# Patient Record
Sex: FEMALE | Race: WHITE
[De-identification: names, ages, dates, MRNs, and addresses within clinical notes are randomized per-mention and may not be internally consistent; named-entity substitution may affect disease eponyms.]

---

## 2021-04-30 ENCOUNTER — HOSPITAL ENCOUNTER (OUTPATIENT)
Dept: HOSPITAL 11 - JP.SDS | Age: 61
Discharge: HOME | End: 2021-04-30
Attending: SURGERY
Payer: COMMERCIAL

## 2021-04-30 DIAGNOSIS — E11.9: ICD-10-CM

## 2021-04-30 DIAGNOSIS — Z12.11: Primary | ICD-10-CM

## 2021-04-30 DIAGNOSIS — K57.30: ICD-10-CM

## 2021-04-30 PROCEDURE — 45378 DIAGNOSTIC COLONOSCOPY: CPT

## 2021-04-30 NOTE — OR
DATE OF PROCEDURE:  04/30/2021

 

SURGEON:  Christopher Cross MD

 

PROCEDURE:  Colonoscopy.

 

FINDINGS:  Diverticulosis, mild, limited to sigmoid colon.

 

COMPLICATIONS:  None.

 

ASSISTANT:  None.

 

ANESTHESIA:  MAC.

 

PREOPERATIVE DIAGNOSIS:  Screening colonoscopy.

 

POSTOPERATIVE DIAGNOSIS:  Screening colonoscopy.

 

RISKS:  Risks, benefits, alternatives, and limitations including, but not limited to

infection, bleeding, perforation, and false positives and false negatives were explained to

the patient who wished to proceed.

 

PROCEDURE IN DETAIL:  The patient was placed in left lateral decubitus position.  Digital

rectal exam was performed without abnormality.  Scope was introduced and advanced

atraumatically to the ileocecal valve.  Scope was brought back to the ascending, transverse,

descending colon, and retroflexed.

 

No evidence of old or new blood.  No masses.  No polyps.  No colitis.  No abnormalities on

retroflexion.  The diverticulosis would be described as mild, limited to sigmoid colon

without evidence of diverticulitis.

 

Greater than 8 minutes was spent removing the scope.  The patient tolerated the procedure

well, and the prep was acceptable with 90% of the luminal surface.

 

 

 

 

Christopher Cross MD

DD:  04/30/2021 11:03:57

DT:  04/30/2021 14:07:35

Job #:  [id number]

## 2021-05-25 ENCOUNTER — RECORDS - HEALTHEAST (OUTPATIENT)
Dept: ADMINISTRATIVE | Facility: CLINIC | Age: 61
End: 2021-05-25

## 2021-05-26 ENCOUNTER — RECORDS - HEALTHEAST (OUTPATIENT)
Dept: ADMINISTRATIVE | Facility: CLINIC | Age: 61
End: 2021-05-26

## 2021-12-02 ENCOUNTER — HOSPITAL ENCOUNTER (EMERGENCY)
Dept: HOSPITAL 11 - JP.ED | Age: 61
Discharge: HOME | End: 2021-12-02
Payer: COMMERCIAL

## 2021-12-02 DIAGNOSIS — Z79.82: ICD-10-CM

## 2021-12-02 DIAGNOSIS — E86.0: Primary | ICD-10-CM

## 2021-12-02 DIAGNOSIS — Z79.899: ICD-10-CM

## 2021-12-02 DIAGNOSIS — E78.00: ICD-10-CM

## 2021-12-02 DIAGNOSIS — Z79.84: ICD-10-CM

## 2021-12-02 DIAGNOSIS — E11.9: ICD-10-CM

## 2021-12-02 DIAGNOSIS — I10: ICD-10-CM

## 2021-12-02 PROCEDURE — 87040 BLOOD CULTURE FOR BACTERIA: CPT

## 2021-12-02 PROCEDURE — 84443 ASSAY THYROID STIM HORMONE: CPT

## 2021-12-02 PROCEDURE — 71046 X-RAY EXAM CHEST 2 VIEWS: CPT

## 2021-12-02 PROCEDURE — 83605 ASSAY OF LACTIC ACID: CPT

## 2021-12-02 PROCEDURE — 99284 EMERGENCY DEPT VISIT MOD MDM: CPT

## 2021-12-02 PROCEDURE — 85379 FIBRIN DEGRADATION QUANT: CPT

## 2021-12-02 PROCEDURE — 84484 ASSAY OF TROPONIN QUANT: CPT

## 2021-12-02 PROCEDURE — 85027 COMPLETE CBC AUTOMATED: CPT

## 2021-12-02 PROCEDURE — 36415 COLL VENOUS BLD VENIPUNCTURE: CPT

## 2021-12-02 PROCEDURE — 80048 BASIC METABOLIC PNL TOTAL CA: CPT

## 2021-12-02 PROCEDURE — 81001 URINALYSIS AUTO W/SCOPE: CPT

## 2021-12-02 NOTE — EDM.PDOC
ED HPI GENERAL MEDICAL PROBLEM





- General


Chief Complaint: Syncope


Stated Complaint: FAINTED TWICE


Time Seen by Provider: 12/02/21 04:20


Source of Information: Reports: Patient, Old Records, RN


History Limitations: Reports: No Limitations





- History of Present Illness


INITIAL COMMENTS - FREE TEXT/NARRATIVE: 





62 yo diabetic female had a low grade fever at suppertime last night. After that

she felt OK. She slept until she awoke needing to go to the bathroom. On the way

back to her bed from the bathroom she passed out twice. On the way to the ER she

walked without issue. Has had a bit of a cough lately, nonproductive. No black 

or bloody stools. No vomiting. No hx of syncope. Her BS at home after she passed

out was 190. She did not injure herself in the falls. Is here with her . 

She took Nyquil before bedtime that has acetaminophen in it. 


Onset Date: 12/01/21


Duration: Hour(s):, Waxing/Waning


Location: Reports: Generalized


Quality: Reports: Other (pain is not reported)


Severity: Moderate (syncope severity)


Improves with: Reports: Rest


Worsens with: Reports: Other (? vertical position)


Context: Reports: Other (see HPI)


Associated Symptoms: Reports: Cough (mild), Fever/Chills (low grade fever early 

last evening).  Denies: Nausea/Vomiting, Rash


Treatments PTA: Reports: Other (see below) (none)





- Related Data


                                    Allergies











Allergy/AdvReac Type Severity Reaction Status Date / Time


 


No Known Allergies Allergy   Verified 12/02/21 04:50











Home Meds: 


                                    Home Meds





Anastrozole [Arimidex] 1 mg PO DAILY 04/27/21 [History]


Aspirin [Adult Low Dose Aspirin EC] 81 mg PO DAILY 04/27/21 [History]


Cholecalciferol (Vitamin D3) [Vitamin D3] 1,000 unit PO DAILY 04/27/21 [History]


Dulaglutide [Trulicity] 0.75 mg SQ DAILY 04/27/21 [History]


Omega-3/DHA/Epa/Fish Oil [Omega 3 500 Softgel] 1,000 each PO DAILY 04/27/21 

[History]


Simvastatin [Zocor] 20 mg PO BEDTIME 04/27/21 [History]


Vit C/Rut/Hesp Cmp/Bioflav,Cit [Special C 500 mg Tablet] 1 each PO DAILY 

04/27/21 [History]


lisinopriL [Lisinopril] 5 mg PO DAILY 04/27/21 [History]


metFORMIN [Glucophage XR] 1,000 mg PO BID 04/27/21 [History]











Past Medical History


HEENT History: Reports: None


Cardiovascular History: Reports: High Cholesterol, Hypertension


OB/GYN History: Reports: Pregnancy


Neurological History: Reports: Concussion


Endocrine/Metabolic History: Reports: Diabetes, Type II


Oncologic (Cancer) History: Reports: Breast


Dermatologic History: Reports: None





- Infectious Disease History


Infectious Disease History: Reports: Chicken Pox, Shingles





- Past Surgical History


HEENT Surgical History: Reports: Oral Surgery


Cardiovascular Surgical History: Reports: None


Respiratory Surgical History: Reports: None


Female  Surgical History: Reports: Tubal Ligation, Other (See Below)


Other Female  Surgeries/Procedures: double mastectomy for breast cancer


Endocrine Surgical History: Reports: None


Neurological Surgical History: Reports: None


Dermatological Surgical History: Reports: Skin Graft





Social & Family History





- Family History


Family Medical History: No Pertinent Family History





- Caffeine Use


Caffeine Use: Reports: Coffee





ED ROS GENERAL





- Review of Systems


Review Of Systems: See Below


Constitutional: Reports: Fever.  Denies: Chills


HEENT: Reports: No Symptoms


Respiratory: Reports: Cough.  Denies: Shortness of Breath, Sputum


Cardiovascular: Reports: Syncope


Endocrine: Reports: No Symptoms


GI/Abdominal: Reports: No Symptoms


: Reports: No Symptoms


Musculoskeletal: Reports: No Symptoms


Skin: Reports: No Symptoms


Neurological: Reports: No Symptoms





- Physical Exam


Exam: See Below


Exam Limited By: No Limitations


General Appearance: Alert, WD/WN, No Apparent Distress


Eye Exam: Bilateral Eye: Normal Inspection


Ears: Normal External Exam, Normal Canal, Hearing Grossly Normal


Nose: Normal Inspection, No Blood


Throat/Mouth: Normal Inspection, Normal Lips, Normal Oropharynx, Normal Voice, 

No Airway Compromise


Head Exam: Atraumatic, Normocephalic


Neck: Normal Inspection


Respiratory/Chest: No Respiratory Distress, Lungs Clear, Normal Breath Sounds, 

No Accessory Muscle Use


Cardiovascular: Regular Rate, Rhythm, No Edema, Tachycardia


GI/Abdominal: Normal Bowel Sounds, Soft, Non-Tender, No Distention


Neuro Exam (Abbreviated): Alert, Oriented, CN II-XII Intact, Normal Cognition, 

No Motor/Sensory Deficits


Back Exam: Normal Inspection.  No: CVA Tenderness (R), CVA Tenderness (L)


Extremities: Normal Inspection, Normal Range of Motion, Non-Tender, No Pedal 

Edema.  No: Pedal Edema


Psychiatric: Normal Affect, Normal Mood


Skin Exam: Warm, Dry, Intact, Normal Color, No Rash





Course





- Vital Signs


Last Recorded V/S: 


                                Last Vital Signs











Temp  36.8 C   12/02/21 05:25


 


Pulse  112 H  12/02/21 04:52


 


Resp  18   12/02/21 04:52


 


BP  155/88 H  12/02/21 04:52


 


Pulse Ox  97   12/02/21 04:52








                                        





Orthostatic Blood Pressure [     121/74


Standing]                        


Orthostatic Blood Pressure [     124/76


Sitting]                         


Orthostatic Blood Pressure [     132/80


Supine]                          











- Orders/Labs/Meds


Orders: 


                               Active Orders 24 hr











 Category Date Time Status


 


 Orthostatic Vital Signs [RC] ASDIRECTED Care  12/02/21 04:18 Active


 


 Chest 2V [CR] Stat Exams  12/02/21 06:31 Taken


 


 CULTURE BLOOD [BC] Stat Lab  12/02/21 05:04 Ordered


 


 D-DIMER QUANTITATIVE [COAG] Stat Lab  12/02/21 04:04 Stop Req


 


 REFLEX LACTIC ACID YES OR NO [CHEM] Routine Lab  12/02/21 04:51 Received











Labs: 


                                Laboratory Tests











  12/02/21 12/02/21 12/02/21 Range/Units





  04:04 04:04 04:04 


 


WBC  7.7    (4.5-11.0)  K/uL


 


RBC  5.13    (3.30-5.50)  M/uL


 


Hgb  13.4    (12.0-15.0)  g/dL


 


Hct  42.9    (36.0-48.0)  %


 


MCV  84    (80-98)  fL


 


MCH  26 L    (27-31)  pg


 


MCHC  31 L    (32-36)  %


 


Plt Count  403 H    (150-400)  K/uL


 


Sodium   137 L   (140-148)  mmol/L


 


Potassium   3.6   (3.6-5.2)  mmol/L


 


Chloride   99 L   (100-108)  mmol/L


 


Carbon Dioxide   26   (21-32)  mmol/L


 


Anion Gap   15.6 H   (5.0-14.0)  mmol/L


 


BUN   8   (7-18)  mg/dL


 


Creatinine   0.8   (0.6-1.0)  mg/dL


 


Est Cr Clr Drug Dosing   54.39   mL/min


 


Estimated GFR (MDRD)   > 60   (>60)  


 


Glucose   194 H   ()  mg/dL


 


Lactic Acid     (0.4-2.0)  mmol/L


 


Calcium   9.3   (8.5-10.1)  mg/dL


 


Troponin I High Sens   4.6   (<=60.3)  pg/mL


 


TSH, Ultra Sensitive    2.469  (0.358-3.740)  uIU/mL


 


Urine Color     (YELLOW)  


 


Urine Appearance     (CLEAR)  


 


Urine pH     (5.0-8.0)  


 


Ur Specific Gravity     (1.008-1.030)  


 


Urine Protein     (NEGATIVE)  mg/dL


 


Urine Glucose (UA)     (NEGATIVE)  mg/dL


 


Urine Ketones     (NEGATIVE)  mg/dL


 


Urine Occult Blood     (NEGATIVE)  


 


Urine Nitrite     (NEGATIVE)  


 


Urine Bilirubin     (NEGATIVE)  


 


Urine Urobilinogen     (0.2-1.0)  EU/dL


 


Ur Leukocyte Esterase     (NEGATIVE)  


 


Urine RBC     (0-5)  


 


Urine WBC     (0-5)  


 


Ur Epithelial Cells     


 


Amorphous Sediment     


 


Urine Bacteria     


 


Urine Mucus     














  12/02/21 12/02/21 Range/Units





  04:07 05:09 


 


WBC    (4.5-11.0)  K/uL


 


RBC    (3.30-5.50)  M/uL


 


Hgb    (12.0-15.0)  g/dL


 


Hct    (36.0-48.0)  %


 


MCV    (80-98)  fL


 


MCH    (27-31)  pg


 


MCHC    (32-36)  %


 


Plt Count    (150-400)  K/uL


 


Sodium    (140-148)  mmol/L


 


Potassium    (3.6-5.2)  mmol/L


 


Chloride    (100-108)  mmol/L


 


Carbon Dioxide    (21-32)  mmol/L


 


Anion Gap    (5.0-14.0)  mmol/L


 


BUN    (7-18)  mg/dL


 


Creatinine    (0.6-1.0)  mg/dL


 


Est Cr Clr Drug Dosing    mL/min


 


Estimated GFR (MDRD)    (>60)  


 


Glucose    ()  mg/dL


 


Lactic Acid  3.1 H   (0.4-2.0)  mmol/L


 


Calcium    (8.5-10.1)  mg/dL


 


Troponin I High Sens    (<=60.3)  pg/mL


 


TSH, Ultra Sensitive    (0.358-3.740)  uIU/mL


 


Urine Color   Yellow  (YELLOW)  


 


Urine Appearance   Cloudy A  (CLEAR)  


 


Urine pH   5.5  (5.0-8.0)  


 


Ur Specific Gravity   1.025  (1.008-1.030)  


 


Urine Protein   Negative  (NEGATIVE)  mg/dL


 


Urine Glucose (UA)   Negative  (NEGATIVE)  mg/dL


 


Urine Ketones   15 H  (NEGATIVE)  mg/dL


 


Urine Occult Blood   Negative  (NEGATIVE)  


 


Urine Nitrite   Negative  (NEGATIVE)  


 


Urine Bilirubin   Negative  (NEGATIVE)  


 


Urine Urobilinogen   0.2  (0.2-1.0)  EU/dL


 


Ur Leukocyte Esterase   Small H  (NEGATIVE)  


 


Urine RBC   0-5  (0-5)  


 


Urine WBC   5-10 H  (0-5)  


 


Ur Epithelial Cells   Moderate  


 


Amorphous Sediment   Not seen  


 


Urine Bacteria   Few  


 


Urine Mucus   Many  











Meds: 


Medications














Discontinued Medications














Generic Name Dose Route Start Last Admin





  Trade Name Freq  PRN Reason Stop Dose Admin


 


Acetaminophen  1,000 mg  12/02/21 05:05  12/02/21 05:45





  Acetaminophen 500 Mg Tab  PO  12/02/21 05:06  1,000 mg





  ONETIME ONE   Administration


 


Lactated Ringer's  1,000 mls @ 1,000 mls/hr  12/02/21 05:04  12/02/21 05:44





  Ringers, Lactated  IV  12/02/21 06:03  1,000 mls/hr





  BOLUS ONE   Administration














- Radiology Interpretation


Free Text/Narrative:: 





CXR-neg





- Re-Assessments/Exams


Free Text/Narrative Re-Assessment/Exam: 





12/02/21 07:03


HR down to 89 after a liter of IV fluids. Feels fine. CXR neg





Departure





- Departure


Time of Disposition: 07:04


Disposition: Home, Self-Care 01


Condition: Good


Clinical Impression: 


 Mild dehydration








- Discharge Information


*PRESCRIPTION DRUG MONITORING PROGRAM REVIEWED*: Not Applicable


*COPY OF PRESCRIPTION DRUG MONITORING REPORT IN PATIENT JOHNNA: Not Applicable


Instructions:  Dehydration, Adult, Easy-to-Read


Referrals: 


PCP,None [Primary Care Provider] - 


Forms:  ED Department Discharge


Additional Instructions: 


Drink enough fluids so that your urine is light yellow in color. Acetaminophen 

as needed for fever control. Return if you become SOB, high fever, nausea or 

anything else that concerns you. 





Sepsis Event Note (ED)





- Focused Exam


Vital Signs: 


                                   Vital Signs











  Temp Pulse Resp BP Pulse Ox


 


 12/02/21 05:25  36.8 C    


 


 12/02/21 04:52  36.7 C  112 H  18  155/88 H  97


 


 12/02/21 04:26  36.7 C  112 H  18  155/88 H  97














- My Orders


Last 24 Hours: 


My Active Orders





12/02/21 04:04


D-DIMER QUANTITATIVE [COAG] Stat 





12/02/21 04:18


Orthostatic Vital Signs [RC] ASDIRECTED 





12/02/21 04:51


REFLEX LACTIC ACID YES OR NO [CHEM] Routine 





12/02/21 05:04


CULTURE BLOOD [BC] Stat 





12/02/21 06:31


Chest 2V [CR] Stat 














- Assessment/Plan


Last 24 Hours: 


My Active Orders





12/02/21 04:04


D-DIMER QUANTITATIVE [COAG] Stat 





12/02/21 04:18


Orthostatic Vital Signs [RC] ASDIRECTED 





12/02/21 04:51


REFLEX LACTIC ACID YES OR NO [CHEM] Routine 





12/02/21 05:04


CULTURE BLOOD [BC] Stat 





12/02/21 06:31


Chest 2V [CR] Stat

## 2021-12-02 NOTE — CRLCR
For Patients:  As a result of the 21st Century Cures Act, medical imaging 

exams and procedure reports are released immediately into your electronic 

medical record.  You may view this report before your referring provider.  

If you have questions, please contact your health care provider.



Indication:



Cough and fever 



Comparison:



None available.



Technique:



PA and Lateral views chest



Findings:



There is mild interstitial prominence without evidence of dense 

consolidation or effusion. A situs lobe is seen in the medial right upper 

quadrant.



The cardiomediastinal silhouette is within normal limits.



The bony thorax is grossly intact.



Impression:



Mild interstitial prominence without evidence of dense consolidation which 

may represent mild bronchitis changes.



Dictated by Eh Ryan MD @ 12/2/2021 7:44:44 AM



(Electronically Signed)

## 2021-12-08 ENCOUNTER — HOSPITAL ENCOUNTER (EMERGENCY)
Dept: HOSPITAL 11 - JP.ED | Age: 61
Discharge: HOME | End: 2021-12-08
Payer: COMMERCIAL

## 2021-12-08 DIAGNOSIS — I10: ICD-10-CM

## 2021-12-08 DIAGNOSIS — Z79.82: ICD-10-CM

## 2021-12-08 DIAGNOSIS — Z79.899: ICD-10-CM

## 2021-12-08 DIAGNOSIS — U07.1: Primary | ICD-10-CM

## 2021-12-08 DIAGNOSIS — E78.00: ICD-10-CM

## 2021-12-08 DIAGNOSIS — Z79.84: ICD-10-CM

## 2021-12-08 DIAGNOSIS — E11.9: ICD-10-CM

## 2021-12-08 PROCEDURE — 87635 SARS-COV-2 COVID-19 AMP PRB: CPT

## 2021-12-08 PROCEDURE — U0002 COVID-19 LAB TEST NON-CDC: HCPCS

## 2021-12-08 PROCEDURE — M0243 CASIRIVI AND IMDEVI INFUSION: HCPCS

## 2021-12-08 PROCEDURE — 99284 EMERGENCY DEPT VISIT MOD MDM: CPT

## 2021-12-08 RX ADMIN — IMDEVIMAB ONE MG: 1332 INJECTION, SOLUTION, CONCENTRATE INTRAVENOUS at 12:34

## 2021-12-08 RX ADMIN — CASIRIVIMAB ONE MG: 1332 INJECTION, SOLUTION, CONCENTRATE INTRAVENOUS at 12:34

## 2021-12-08 NOTE — EDM.PDOC
ED HPI GENERAL MEDICAL PROBLEM





- General


Chief Complaint: Respiratory Problem


Stated Complaint: COUGH, NAUSEA, WEAKNESS


Time Seen by Provider: 12/08/21 10:30


Source of Information: Reports: Patient


History Limitations: Reports: No Limitations





- History of Present Illness


INITIAL COMMENTS - FREE TEXT/NARRATIVE: 





61-year-old female, unvaccinated for Covid, has symptoms of Covid-like illness 

for the past week.  Dizziness and lightheadedness, she was actually evaluated in

the emergency room for syncopal episode and was diagnosed with "mild 

dehydration".  Since that time she has developed some shortness of breath, body 

aches, loss of smell and her  has become ill as well with typical Covid 

symptoms.  They are both in to be tested and treated if possible.  She is mildly

tachycardic but otherwise very stable, no respiratory difficulties.


Onset: Gradual


Duration: Day(s): (7 days of symptoms)


Associated Symptoms: Reports: Cough, Malaise, Syncope, Weakness





- Related Data


                                    Allergies











Allergy/AdvReac Type Severity Reaction Status Date / Time


 


No Known Allergies Allergy   Verified 12/08/21 10:22











Home Meds: 


                                    Home Meds





Anastrozole [Arimidex] 1 mg PO DAILY 04/27/21 [History]


Aspirin [Adult Low Dose Aspirin EC] 81 mg PO DAILY 04/27/21 [History]


Cholecalciferol (Vitamin D3) [Vitamin D3] 1,000 unit PO DAILY 04/27/21 [History]


Dulaglutide [Trulicity] 0.75 mg SQ DAILY 04/27/21 [History]


Omega-3/DHA/Epa/Fish Oil [Omega 3 500 Softgel] 1,000 each PO DAILY 04/27/21 

[History]


Simvastatin [Zocor] 20 mg PO BEDTIME 04/27/21 [History]


Vit C/Rut/Hesp Cmp/Bioflav,Cit [Special C 500 mg Tablet] 1 each PO DAILY 

04/27/21 [History]


lisinopriL [Lisinopril] 5 mg PO DAILY 04/27/21 [History]


metFORMIN [Glucophage XR] 1,000 mg PO BID 04/27/21 [History]











Past Medical History


HEENT History: Reports: None


Cardiovascular History: Reports: High Cholesterol, Hypertension


OB/GYN History: Reports: Pregnancy


Neurological History: Reports: Concussion


Endocrine/Metabolic History: Reports: Diabetes, Type II


Oncologic (Cancer) History: Reports: Breast


Dermatologic History: Reports: None





- Infectious Disease History


Infectious Disease History: Reports: Chicken Pox, Shingles





- Past Surgical History


HEENT Surgical History: Reports: Oral Surgery


Cardiovascular Surgical History: Reports: None


Respiratory Surgical History: Reports: None


Female  Surgical History: Reports: Tubal Ligation, Other (See Below)


Other Female  Surgeries/Procedures: double mastectomy for breast cancer


Endocrine Surgical History: Reports: None


Neurological Surgical History: Reports: None


Dermatological Surgical History: Reports: Skin Graft





Social & Family History





- Family History


Family Medical History: No Pertinent Family History





- Tobacco Use


Tobacco Use Status *Q: Never Tobacco User





- Caffeine Use


Caffeine Use: Reports: Coffee





- Recreational Drug Use


Recreational Drug Use: No





ED ROS GENERAL





- Review of Systems


Review Of Systems: See Below


Constitutional: Reports: Fever (Patient ran a fever 1 week ago, that has 

resolved), Malaise


HEENT: Reports: Other (Lack of smell)


Respiratory: Reports: Shortness of Breath (Intermittent shortness of breath, 

none currently), Cough


Cardiovascular: Denies: Chest Pain, Palpitations


GI/Abdominal: Reports: No Symptoms


Musculoskeletal: Reports: Muscle Pain (Generalized body aches)


Skin: Reports: No Symptoms


Neurological: Reports: Dizziness, Weakness





ED EXAM, GENERAL





- Physical Exam


Exam: See Below


Exam Limited By: No Limitations


General Appearance: Alert, No Apparent Distress


Eye Exam: Bilateral Eye: Normal Inspection


Respiratory/Chest: No Respiratory Distress, Rhonchi (Diffuse scattered rhonchi 

and expiratory wheezes are heard bilaterally)


Cardiovascular: Regular Rate, Rhythm, Tachycardia


Extremities: Normal Inspection


Neurological: Alert, Oriented, No Motor/Sensory Deficits


Psychiatric: Normal Affect, Normal Mood


Skin Exam: Warm, Dry





Course





- Vital Signs


Last Recorded V/S: 


                                Last Vital Signs











Temp  98.0 F   12/08/21 10:20


 


Pulse  120 H  12/08/21 10:20


 


Resp  18   12/08/21 10:20


 


BP  156/82 H  12/08/21 10:20


 


Pulse Ox  96   12/08/21 10:20














- Orders/Labs/Meds


Labs: 


                                Laboratory Tests











  12/08/21 Range/Units





  10:52 


 


SARS CoV-2 RNA Rapid YOLANDA  Positive H  











Meds: 


Medications














Discontinued Medications














Generic Name Dose Route Start Last Admin





  Trade Name Freq  PRN Reason Stop Dose Admin


 


Acetaminophen  650 mg  12/08/21 12:00 





  Acetaminophen 325 Mg Tab  PO  





  ONETIME PRN  





  HEADACHE,CHILLS  


 


Casirivimab  600 mg  12/08/21 12:00  12/08/21 12:34





  Casirivimab ([id number]) 1,332 Mg/11.1 Ml Vial  SUBCUT  12/08/21 12:01  600 mg





  ONETIME ONE   Administration


 


Diphenhydramine HCl  50 mg  12/08/21 12:00 





  Diphenhydramine 50 Mg/Ml Sdv  IVPUSH  





  ONETIME PRN  





  ALLERGIC RXN  


 


Epinephrine HCl  0.3 mg  12/08/21 12:00 





  Epinephrine 1 Mg/Ml Sdv  IM  





  ONETIME PRN  





  ALLERGIC RXN  


 


Famotidine  20 mg  12/08/21 12:00 





  Famotidine 20 Mg/2 Ml Sdv  IV  





  ONETIME PRN  





  ALLERGIC RXN  


 


Imdevimab  600 mg  12/08/21 12:00  12/08/21 12:34





  Imdevimab ([id number]) 1,332 Mg/11.1 Ml Vial  SUBCUT  12/08/21 12:01  600 mg





  ONETIME ONE   Administration


 


Methylprednisolone Sodium Succinate  125 mg  12/08/21 12:00 





  Methylprednisolone Sodium Succinate 125 Mg/2 Ml Sdv  IVPUSH  





  ONETIME PRN  





  ALLERGIC RXN  














- Re-Assessments/Exams


Free Text/Narrative Re-Assessment/Exam: 





12/08/21 11:07


This very likely is Covid in an unvaccinated patient.  It will be confirmed and 

she will be offered monoclonal antibody therapy.


12/08/21 11:28


Covid is positive, patient will receive monoclonal antibody therapy and can 

return if not improving satisfactorily.





Departure





- Departure


Time of Disposition: 13:30


Disposition: Home, Self-Care 01


Clinical Impression: 


 COVID-19








- Discharge Information


Instructions:  COVID-19


Referrals: 


Madeline Díaz PA-C [Primary Care Provider] - 


Forms:  ED Department Discharge


Care Plan Goals: 


Rest, concentrate on fluids, increase activity as tolerated and return anytime 

if worsening especially difficulty breathing or you feel like you are not 

getting enough oxygen.





Sepsis Event Note (ED)





- Evaluation


Sepsis Screening Result: No Definite Risk





- Focused Exam


Vital Signs: 


                                   Vital Signs











  Temp Pulse Resp BP Pulse Ox


 


 12/08/21 10:20  98.0 F  120 H  18  156/82 H  96